# Patient Record
Sex: FEMALE | Race: WHITE | Employment: STUDENT | URBAN - METROPOLITAN AREA
[De-identification: names, ages, dates, MRNs, and addresses within clinical notes are randomized per-mention and may not be internally consistent; named-entity substitution may affect disease eponyms.]

---

## 2021-04-17 ENCOUNTER — OFFICE VISIT (OUTPATIENT)
Dept: ORTHOPEDICS | Facility: CLINIC | Age: 21
End: 2021-04-17
Payer: COMMERCIAL

## 2021-04-17 ENCOUNTER — ANCILLARY PROCEDURE (OUTPATIENT)
Dept: GENERAL RADIOLOGY | Facility: CLINIC | Age: 21
End: 2021-04-17
Attending: FAMILY MEDICINE
Payer: COMMERCIAL

## 2021-04-17 VITALS — BODY MASS INDEX: 24.99 KG/M2 | WEIGHT: 135.8 LBS | HEIGHT: 62 IN

## 2021-04-17 DIAGNOSIS — M79.671 RIGHT FOOT PAIN: Primary | ICD-10-CM

## 2021-04-17 PROCEDURE — 73630 X-RAY EXAM OF FOOT: CPT | Mod: RT | Performed by: RADIOLOGY

## 2021-04-17 PROCEDURE — 99203 OFFICE O/P NEW LOW 30 MIN: CPT | Performed by: FAMILY MEDICINE

## 2021-04-17 ASSESSMENT — MIFFLIN-ST. JEOR: SCORE: 1339.23

## 2021-04-17 NOTE — LETTER
Date:April 19, 2021      Patient was self referred, no letter generated. Do not send.        [facility] Information

## 2021-04-17 NOTE — LETTER
4/17/2021         RE: Edie Saini  [address] [place] [address]        Dear Colleague,    Thank you for referring your patient, Edie Saini, to the [facility] [place]. Please see a copy of my visit note below.      [facility]  SPORTS & ORTHOPEDIC CLINIC VISIT     Apr 17, 2021        ASSESSMENT & PLAN    20 yo with right lateral foot pain after running that is likely coming from her peroneal tendon, but 5th MT stress fracture can't be fully excluded    Reviewed imaging and assessment with patient in detail  Recommended two weeks of rest/cross trainging  Given HEP for peroneal tendons  Gradually return to running after two weeks. Follow up in clinic if still painful.     Sy Rojas MD  [facility] [place]    -----  Chief Complaint   Patient presents with     Right Ankle - Pain       SUBJECTIVE  Edie Saini is a/an 21 year old female who is seen as a self referral for evaluation of  Right foot pain.     The patient is seen by themselves.  The patient is Right handed    Onset: 1 day(s) ago. Patient describes injury as on a long run yesterday and unsure what exactly happened. She stopped and tried to stretch it. Pain is lateral side of foot. She did just get a new pair of running shoes 2 weeks ago. Has been increasing running volumes. 20 miles per week currently.   Location of Pain: right foot   Worsened by: walking   Better with: Rest, Ice   Treatments tried: rest/activity avoidance, ice and ibuprofen  Associated symptoms: swelling    No prior bone stress injury, no weight changes, no dietary restrictions. Normal periods.     Orthopedic/Surgical history: NO  Social History/Occupation: Student       REVIEW OF SYSTEMS:    Do you have fever, chills, weight loss? No    Do you have any vision problems? No    Do you have any chest pain or edema? No    Do you have any shortness of breath or wheezing?  No    Do you have stomach  "problems? No    Do you have any numbness or focal weakness? No    Do you have diabetes? No    Do you have problems with bleeding or clotting? No    Do you have an rashes or other skin lesions? No    OBJECTIVE:  Ht 1.575 m (5' 2\")   Wt 61.6 kg (135 lb 12.8 oz)   BMI 24.84 kg/m       General: Alert, pleasant, no distress  Right foot: warm, well perfused, SILT throughout     Inspection: no swelling, ecchymosis, deformity     ROM:symmetric without pain     Strength:intact, no pain     Palpation:ttp over base of fifth metatarsal with some ttp extending over the peroneal tendons inferior to the lateral mall     Special Tests: none      RADIOLOGY:    3 view xrays of right lateral foot pain performed and reviewed independently demonstrating no acute fracture or dislocation. No djd. See EMR for formal radiology report.                  Again, thank you for allowing me to participate in the care of your patient.        Sincerely,        Sy Rojas MD    "

## 2021-04-17 NOTE — PROGRESS NOTES
"  [facility]  SPORTS & ORTHOPEDIC CLINIC VISIT     Apr 17, 2021        ASSESSMENT & PLAN    22 yo with right lateral foot pain after running that is likely coming from her peroneal tendon, but 5th MT stress fracture can't be fully excluded    Reviewed imaging and assessment with patient in detail  Recommended two weeks of rest/cross trainging  Given HEP for peroneal tendons  Gradually return to running after two weeks. Follow up in clinic if still painful.     Sy Rojas MD  [facility] [place]    -----  Chief Complaint   Patient presents with     Right Ankle - Pain       SUBJECTIVE  Edie Saini is a/an 21 year old female who is seen as a self referral for evaluation of  Right foot pain.     The patient is seen by themselves.  The patient is Right handed    Onset: 1 day(s) ago. Patient describes injury as on a long run yesterday and unsure what exactly happened. She stopped and tried to stretch it. Pain is lateral side of foot. She did just get a new pair of running shoes 2 weeks ago. Has been increasing running volumes. 20 miles per week currently.   Location of Pain: right foot   Worsened by: walking   Better with: Rest, Ice   Treatments tried: rest/activity avoidance, ice and ibuprofen  Associated symptoms: swelling    No prior bone stress injury, no weight changes, no dietary restrictions. Normal periods.     Orthopedic/Surgical history: NO  Social History/Occupation: Student       REVIEW OF SYSTEMS:    Do you have fever, chills, weight loss? No    Do you have any vision problems? No    Do you have any chest pain or edema? No    Do you have any shortness of breath or wheezing?  No    Do you have stomach problems? No    Do you have any numbness or focal weakness? No    Do you have diabetes? No    Do you have problems with bleeding or clotting? No    Do you have an rashes or other skin lesions? No    OBJECTIVE:  Ht 1.575 m (5' 2\")   Wt 61.6 kg (135 lb " 12.8 oz)   BMI 24.84 kg/m       General: Alert, pleasant, no distress  Right foot: warm, well perfused, SILT throughout     Inspection: no swelling, ecchymosis, deformity     ROM:symmetric without pain     Strength:intact, no pain     Palpation:ttp over base of fifth metatarsal with some ttp extending over the peroneal tendons inferior to the lateral mall     Special Tests: none      RADIOLOGY:    3 view xrays of right lateral foot pain performed and reviewed independently demonstrating no acute fracture or dislocation. No djd. See EMR for formal radiology report.

## 2021-08-15 ENCOUNTER — HEALTH MAINTENANCE LETTER (OUTPATIENT)
Age: 21
End: 2021-08-15

## 2021-10-10 ENCOUNTER — HEALTH MAINTENANCE LETTER (OUTPATIENT)
Age: 21
End: 2021-10-10

## 2022-07-24 ENCOUNTER — OFFICE VISIT (OUTPATIENT)
Dept: URGENT CARE | Facility: URGENT CARE | Age: 22
End: 2022-07-24
Payer: COMMERCIAL

## 2022-07-24 VITALS
SYSTOLIC BLOOD PRESSURE: 125 MMHG | TEMPERATURE: 98 F | HEART RATE: 64 BPM | DIASTOLIC BLOOD PRESSURE: 80 MMHG | BODY MASS INDEX: 23.78 KG/M2 | WEIGHT: 130 LBS

## 2022-07-24 DIAGNOSIS — S93.601A FOOT SPRAIN, RIGHT, INITIAL ENCOUNTER: ICD-10-CM
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
## 2022-07-24 DIAGNOSIS — S99.921A FOOT INJURY, RIGHT, INITIAL ENCOUNTER: Primary | ICD-10-CM

## 2022-07-24 PROCEDURE — 99204 OFFICE O/P NEW MOD 45 MIN: CPT | Performed by: INTERNAL MEDICINE

## 2022-07-24 NOTE — PROGRESS NOTES
ASSESSMENT AND PLAN:      ICD-10-CM    1. Foot injury, right, initial encounter  S99.921A XR Foot Right G/E 3 Views     Ankle/Foot Bracing Supplies Order for DME - ONLY FOR DME   2. Injury while descending stairs  W10.9XXA XR Foot Right G/E 3 Views     Ankle/Foot Bracing Supplies Order for DME - ONLY FOR DME   3. Foot sprain, right, initial encounter  S93.601A Ankle/Foot Bracing Supplies Order for DME - ONLY FOR DME       PLAN:  MS Injury/Pain  ice, elevate, rest and Ibuprofen    Patient Instructions       Xray - no fracture.  Radiology review pending.    Post-op shoe   Ice  Elevate  Compression    Recheck if symptoms not improved as expected.        Return if symptoms worsen or fail to improve.        Erika Silverio MD  [facility]    Subjective     Edie Saini is a 22 year old who presents for Patient presents with:  Urgent Care  Foot Pain: Right swollen foot, pt thinks she rolled her foot while walking down the stairs     a new patient of [facility].    MS Injury/Pain    Onset of symptoms was 1 day(s) ago.  Location: right ankle  Context:       The injury happened while on stairs      Mechanism: rolled ankle, did not fall      Patient experienced immediate pain, immediate swelling, was able to bear weight directly after injury, limped  Course of symptoms is same.      Current and Associated symptoms: Pain, Swelling and Bruising  Aggravating Factors: weight-bearing  Therapies to improve symptoms include: ice, ibuprofen and elevation  This is the first time this type of problem has occurred for this patient.         Objective    /80   Pulse 64   Temp 98  F (36.7  C) (Oral)   Wt 59 kg (130 lb)   BMI 23.78 kg/m    Physical Exam  Vitals reviewed.   Constitutional:       Appearance: Normal appearance.   Musculoskeletal:      Comments: Exam of the injured foot - pain, swelling ecchymosis over lateral tarsal & 4th metatarsal  ankle no swelling /tenderness over the lateral malleolus. No  tenderness over the medial aspect of the ankle.   The fifth metatarsal is not tender.     The rest of the foot, ankle and leg exam is normal.     Neurological:      Mental Status: She is alert.            Xray - Reviewed and interpreted by me.  No fracture

## 2022-07-24 NOTE — LETTER
[facility] [place] [address] [place] [address]  Phone: [phone]    July 24, 2022        Edie Saini  [address] [place] [facility] [address]          To whom it may concern:    RE: Edie Saini    Patient was seen and treated today at our clinic.  Please excuse 3 days off of work.    Please contact me for questions or concerns.      Sincerely,        Erika Silverio MD

## 2022-07-24 NOTE — PATIENT INSTRUCTIONS
Xray - no fracture.  Radiology review pending.    Post-op shoe   Ice  Elevate  Compression    Recheck if symptoms not improved as expected.

## 2022-09-24 ENCOUNTER — HEALTH MAINTENANCE LETTER (OUTPATIENT)
Age: 22
End: 2022-09-24

## 2022-12-10 ENCOUNTER — OFFICE VISIT (OUTPATIENT)
Dept: URGENT CARE | Facility: URGENT CARE | Age: 22
End: 2022-12-10
Payer: COMMERCIAL

## 2022-12-10 VITALS
TEMPERATURE: 98.2 F | WEIGHT: 120 LBS | DIASTOLIC BLOOD PRESSURE: 67 MMHG | BODY MASS INDEX: 22.08 KG/M2 | OXYGEN SATURATION: 98 % | HEART RATE: 89 BPM | SYSTOLIC BLOOD PRESSURE: 101 MMHG | HEIGHT: 62 IN

## 2022-12-10 DIAGNOSIS — R07.0 THROAT PAIN: Primary | ICD-10-CM

## 2022-12-10 LAB
DEPRECATED S PYO AG THROAT QL EIA: NEGATIVE
GROUP A STREP BY PCR: NOT DETECTED

## 2022-12-10 PROCEDURE — 87651 STREP A DNA AMP PROBE: CPT | Performed by: PHYSICIAN ASSISTANT

## 2022-12-10 PROCEDURE — 99213 OFFICE O/P EST LOW 20 MIN: CPT | Performed by: PHYSICIAN ASSISTANT

## 2022-12-10 RX ORDER — LEVONORGESTREL 52 MG/1
1 INTRAUTERINE DEVICE INTRAUTERINE ONCE
COMMUNITY

## 2022-12-10 NOTE — PROGRESS NOTES
"SUBJECTIVE:   Edie Saini is a 22 year old female presenting with a chief complaint of fever and sore throat.  Onset of symptoms was 5 day(s) ago.  Course of illness is same.    Severity moderate  Current and Associated symptoms: runny nose and stuffy nose  Treatment measures tried include Tylenol/Ibuprofen.  Predisposing factors include None.    No past medical history on file.  Current Outpatient Medications   Medication Sig Dispense Refill     levonorgestrel (MIRENA, 52 MG,) 20 MCG/DAY IUD 1 each by Intrauterine route once       Social History     Tobacco Use     Smoking status: Never     Smokeless tobacco: Never   Substance Use Topics     Alcohol use: Not on file       ROS:  Review of systems negative except as stated above.    OBJECTIVE:  /67   Pulse 89   Temp 98.2  F (36.8  C) (Oral)   Ht 1.575 m (5' 2\")   Wt 54.4 kg (120 lb)   SpO2 98%   BMI 21.95 kg/m    GENERAL APPEARANCE: healthy, alert and no distress  EYES: conjunctiva clear  HENT: ear canals and TM's normal.  Throat erythematous with uvula midline  NECK: supple, nontender, no lymphadenopathy  RESP: lungs clear to auscultation - no rales, rhonchi or wheezes  CV: regular rates and rhythm, normal S1 S2, no murmur noted  NEURO: Normal strength and tone, sensory exam grossly normal,  normal speech and mentation  SKIN: no suspicious lesions or rashes      Results for orders placed or performed in visit on 12/10/22   Streptococcus A Rapid Screen w/Reflex to PCR - Clinic Collect     Status: Normal    Specimen: Throat; Swab   Result Value Ref Range    Group A Strep antigen Negative Negative       ASSESSMENT:  (R07.0) Throat pain  (primary encounter diagnosis)  Plan: Streptococcus A Rapid Screen w/Reflex to PCR -         Clinic Collect, Group A Streptococcus PCR         Throat Swab      Red flags and emergent follow up discussed, and understood by patient  Follow up with PCP if symptoms worsen or fail to improve          "

## 2023-05-09 ENCOUNTER — OFFICE VISIT (OUTPATIENT)
Dept: URGENT CARE | Facility: URGENT CARE | Age: 23
End: 2023-05-09
Payer: COMMERCIAL

## 2023-05-09 VITALS
HEART RATE: 88 BPM | DIASTOLIC BLOOD PRESSURE: 74 MMHG | OXYGEN SATURATION: 98 % | TEMPERATURE: 98.5 F | BODY MASS INDEX: 22.86 KG/M2 | WEIGHT: 125 LBS | SYSTOLIC BLOOD PRESSURE: 105 MMHG

## 2023-05-09 DIAGNOSIS — H10.32 ACUTE BACTERIAL CONJUNCTIVITIS OF LEFT EYE: ICD-10-CM

## 2023-05-09 DIAGNOSIS — J02.9 SORE THROAT: Primary | ICD-10-CM

## 2023-05-09 LAB — DEPRECATED S PYO AG THROAT QL EIA: NEGATIVE

## 2023-05-09 PROCEDURE — 87651 STREP A DNA AMP PROBE: CPT | Performed by: PHYSICIAN ASSISTANT

## 2023-05-09 PROCEDURE — 99213 OFFICE O/P EST LOW 20 MIN: CPT | Performed by: PHYSICIAN ASSISTANT

## 2023-05-09 RX ORDER — OFLOXACIN 3 MG/ML
1-2 SOLUTION/ DROPS OPHTHALMIC
Qty: 7 ML | Refills: 0 | Status: SHIPPED | OUTPATIENT
Start: 2023-05-09 | End: 2023-05-16

## 2023-05-09 NOTE — PROGRESS NOTES
Chief Complaint   Patient presents with     Urgent Care     Eye Infection     C/O eye infection for 1 day       ASSESSMENT/PLAN:  Edie was seen today for urgent care and eye infection.    Diagnoses and all orders for this visit:    Sore throat  -     Streptococcus A Rapid Screen w/Reflex to PCR - Clinic Collect  -     Group A Streptococcus PCR Throat Swab    Acute bacterial conjunctivitis of left eye  -     ofloxacin (OCUFLOX) 0.3 % ophthalmic solution; Place 1-2 drops Into the left eye every 2 hours (while awake) for 7 days    Unilateral conjunctivitis with purulent discharge noted making more likely to be bacterial.  Start on Ocuflox above since she is a contact lens wear.    Strep test negative.  Likely viral URI.  Symptomatic cares as outlined in the AVS    Rk Hylton PA-C      SUBJECTIVE:  Edie is a 23 year old female who presents to urgent care with 3 to 4 days of nasal congestion, runny nose, sore throat, mild cough and 1 day of left eye redness, irritation and discharge.  Wears contacts but does not sleep in them.    ROS: Pertinent ROS neg other than the symptoms noted above in the HPI.     OBJECTIVE:  /74   Pulse 88   Temp 98.5  F (36.9  C) (Tympanic)   Wt 56.7 kg (125 lb)   SpO2 98%   BMI 22.86 kg/m     GENERAL: healthy, alert and no distress  EYES: Left conjunctiva erythema with dried discharge at the corner of the eye, no foreign body on eyelid eversion, PERRLA, no photophobia  HENT: ear canals and TM's normal, nose and mouth without ulcers or lesions, tonsils 1+ bilaterally and erythematous  NECK: no adenopathy, nontender RESP: lungs clear to auscultation - no rales, rhonchi or wheezes  CV: regular rate and rhythm, normal S1 S2, no S3 or S4, no murmur, click or rub    DIAGNOSTICS    Results for orders placed or performed in visit on 05/09/23   Streptococcus A Rapid Screen w/Reflex to PCR - Clinic Collect     Status: Normal    Specimen: Throat; Swab   Result Value Ref Range    Group A  Strep antigen Negative Negative        Current Outpatient Medications   Medication     levonorgestrel (MIRENA, 52 MG,) 20 MCG/DAY IUD     No current facility-administered medications for this visit.      There is no problem list on file for this patient.     No past medical history on file.  No past surgical history on file.  No family history on file.  Social History     Tobacco Use     Smoking status: Never     Passive exposure: Never     Smokeless tobacco: Never   Vaping Use     Vaping status: Not on file   Substance Use Topics     Alcohol use: Not on file              The plan of care was discussed with the patient. They understand and agree with the course of treatment prescribed. A printed summary was given including instructions and medications.  The use of Dragon/[employer] dictation services may have been used to construct the content in this note; any grammatical or spelling errors are non-intentional. Please contact the author of this note directly if you are in need of any clarification.

## 2023-05-10 LAB — GROUP A STREP BY PCR: NOT DETECTED

## 2023-05-12 ENCOUNTER — OFFICE VISIT (OUTPATIENT)
Dept: FAMILY MEDICINE | Facility: CLINIC | Age: 23
End: 2023-05-12
Payer: COMMERCIAL

## 2023-05-12 VITALS
HEART RATE: 86 BPM | HEIGHT: 62 IN | DIASTOLIC BLOOD PRESSURE: 71 MMHG | SYSTOLIC BLOOD PRESSURE: 111 MMHG | OXYGEN SATURATION: 95 % | BODY MASS INDEX: 22.58 KG/M2 | WEIGHT: 122.7 LBS | TEMPERATURE: 98.3 F

## 2023-05-12 DIAGNOSIS — R23.8 PAPULE: Primary | ICD-10-CM

## 2023-05-12 ASSESSMENT — ANXIETY QUESTIONNAIRES
7. FEELING AFRAID AS IF SOMETHING AWFUL MIGHT HAPPEN: SEVERAL DAYS
GAD7 TOTAL SCORE: 7
2. NOT BEING ABLE TO STOP OR CONTROL WORRYING: SEVERAL DAYS
5. BEING SO RESTLESS THAT IT IS HARD TO SIT STILL: SEVERAL DAYS
GAD7 TOTAL SCORE: 7
3. WORRYING TOO MUCH ABOUT DIFFERENT THINGS: SEVERAL DAYS
1. FEELING NERVOUS, ANXIOUS, OR ON EDGE: SEVERAL DAYS
6. BECOMING EASILY ANNOYED OR IRRITABLE: SEVERAL DAYS

## 2023-05-12 ASSESSMENT — PATIENT HEALTH QUESTIONNAIRE - PHQ9
SUM OF ALL RESPONSES TO PHQ QUESTIONS 1-9: 6
5. POOR APPETITE OR OVEREATING: SEVERAL DAYS

## 2023-05-12 NOTE — PROGRESS NOTES
"Assessment & Plan   Edie was seen today for bump.    Diagnoses and all orders for this visit:    Papule  -Small papule with mild scale. Present for 1 month. Likely started as a pimple and has not completely healed due to ongoing manipulation of the skin. Discussed avoid picking, touching, or squeezing lesion. Avoid retinoid over the lesion until the scale has resolved. Apply vaseline to lips 1-2x/day. If it continues to be nonhealing in 2 weeks, patient will send a [employer] message and I will refer to derm for possible BCC vs SCC given high sun exposure location.    Sierra VALLESDra Sanchez, NP   ______________________________________    Subjective   Edie is a 23 year old patient here today to establish care and bump (On lower lip). She is otherwise healthy with no significant PMH.    Bump on right lower lip  -Present for 1 month  -tried popping it  -Picking at it for the last week  -Previously used nelda bees, but started using vaseline on her lips 2 days ago  -Usually does not have acne prone skin  -Current skin regimen includes hyaluronic acid, retinoid & sunscreen    Normal PAP 2021    Do you need any refills on your Medications today? No    Medical, surgical, family and social histories reviewed and updated as indicated.   Medications and allergies reviewed and updated as indicated.       ROS  Review Of Systems  See HPI    General Physical Exam:  Vitals: /71   Pulse 86   Temp 98.3  F (36.8  C) (Oral)   Ht 1.577 m (5' 2.1\")   Wt 55.7 kg (122 lb 11.2 oz)   LMP 05/02/2023 (Approximate)   SpO2 95%   BMI 22.37 kg/m    Physical Exam  Vitals and nursing note reviewed.   Constitutional:       General: She is not in acute distress.     Appearance: Normal appearance. She is not ill-appearing.   HENT:      Head: Normocephalic and atraumatic.   Eyes:      General: Lids are normal.      Conjunctiva/sclera: Conjunctivae normal.   Pulmonary:      Effort: Pulmonary effort is normal.   Skin:     Comments: 2 mm erythematous " papule with mild scaling.    Neurological:      General: No focal deficit present.      Mental Status: She is alert and oriented to person, place, and time.      Gait: Gait is intact.   Psychiatric:         Mood and Affect: Mood normal.         Behavior: Behavior normal.         Thought Content: Thought content normal.         Judgment: Judgment normal.

## 2023-05-12 NOTE — NURSING NOTE
"ROOM:1  FELIX [facility]    Preferred Name: Edie     How did you hear about us?  Internet / Google Search    23 year old  Chief Complaint   Patient presents with     bump     On lower lip       Blood pressure 111/71, pulse 86, temperature 98.3  F (36.8  C), temperature source Oral, height 1.577 m (5' 2.1\"), weight 55.7 kg (122 lb 11.2 oz), last menstrual period 05/02/2023, SpO2 95 %. Body mass index is 22.37 kg/m .  BP completed using cuff size:        There is no problem list on file for this patient.      Wt Readings from Last 2 Encounters:   05/12/23 55.7 kg (122 lb 11.2 oz)   05/09/23 56.7 kg (125 lb)     BP Readings from Last 3 Encounters:   05/12/23 111/71   05/09/23 105/74   12/10/22 101/67       Allergies   Allergen Reactions     Seasonal Allergies        Current Outpatient Medications   Medication     Cyanocobalamin (VITAMIN B 12 PO)     levonorgestrel (MIRENA, 52 MG,) 20 MCG/DAY IUD     ofloxacin (OCUFLOX) 0.3 % ophthalmic solution     UNABLE TO FIND     VITAMIN D PO     No current facility-administered medications for this visit.       Social History     Tobacco Use     Smoking status: Never     Passive exposure: Current     Smokeless tobacco: Never   Vaping Use     Vaping status: Never Used   Substance Use Topics     Alcohol use: Yes     Drug use: Yes     Types: Marijuana       Honoring Choices - Health Care Directive Guide offered to patient at time of visit.    Health Maintenance Due   Topic Date Due     YEARLY PREVENTIVE VISIT  Never done     CHLAMYDIA SCREENING  Never done     ADVANCE CARE PLANNING  Never done     HEPATITIS B IMMUNIZATION (1 of 3 - 3-dose series) Never done     HIV SCREENING  Never done     HEPATITIS C SCREENING  Never done     PAP  Never done       Immunization History   Administered Date(s) Administered     COVID-19 Bivalent 18+ (Moderna) 10/23/2022     COVID-19 Monovalent 18+ (Moderna) 03/01/2021       No results found for: PAP    No lab results found.        4/17/2021    11:52 AM "   PHQ-2 ( 1999 Pfizer)   Q1: Little interest or pleasure in doing things 0   Q2: Feeling down, depressed or hopeless 0   PHQ-2 Score 0   PHQ-2 Total Score (12-17 Years)- Positive if 3 or more points; Administer PHQ-A if positive 0           5/12/2023     1:34 PM   PHQ-9 SCORE   PHQ-9 Total Score 6           5/12/2023     1:34 PM   CAITLYN-7 SCORE   Total Score 7            View : No data to display.                Carisa Lopez, [facility]    May 12, 2023 1:48 PM

## 2023-10-08 ENCOUNTER — HEALTH MAINTENANCE LETTER (OUTPATIENT)
Age: 23
End: 2023-10-08

## 2024-01-22 ENCOUNTER — OFFICE VISIT (OUTPATIENT)
Dept: OBGYN | Facility: CLINIC | Age: 24
End: 2024-01-22
Attending: ADVANCED PRACTICE MIDWIFE
Payer: COMMERCIAL

## 2024-01-22 VITALS
HEIGHT: 62 IN | BODY MASS INDEX: 22.95 KG/M2 | SYSTOLIC BLOOD PRESSURE: 121 MMHG | HEART RATE: 76 BPM | DIASTOLIC BLOOD PRESSURE: 86 MMHG | WEIGHT: 124.7 LBS

## 2024-01-22 DIAGNOSIS — Z12.4 SCREENING FOR MALIGNANT NEOPLASM OF CERVIX: ICD-10-CM

## 2024-01-22 DIAGNOSIS — Z11.3 ROUTINE SCREENING FOR STI (SEXUALLY TRANSMITTED INFECTION): ICD-10-CM

## 2024-01-22 DIAGNOSIS — N93.9 ABNORMAL UTERINE BLEEDING (AUB): Primary | ICD-10-CM

## 2024-01-22 PROCEDURE — 87491 CHLMYD TRACH DNA AMP PROBE: CPT | Performed by: OBSTETRICS & GYNECOLOGY

## 2024-01-22 PROCEDURE — G0145 SCR C/V CYTO,THINLAYER,RESCR: HCPCS | Performed by: OBSTETRICS & GYNECOLOGY

## 2024-01-22 PROCEDURE — 87591 N.GONORRHOEAE DNA AMP PROB: CPT | Performed by: OBSTETRICS & GYNECOLOGY

## 2024-01-22 PROCEDURE — 99213 OFFICE O/P EST LOW 20 MIN: CPT | Mod: GC | Performed by: OBSTETRICS & GYNECOLOGY

## 2024-01-22 PROCEDURE — 99213 OFFICE O/P EST LOW 20 MIN: CPT | Mod: 25 | Performed by: OBSTETRICS & GYNECOLOGY

## 2024-01-22 NOTE — PATIENT INSTRUCTIONS
Thank you for trusting us with your care!     If you need to contact us for questions about:  Symptoms, Scheduling & Medical Questions; Non-urgent (2-3 day response) Elodia message, Urgent (needing response today) [phone] (if after 3:30pm next day response)   Prescriptions: Please call your Pharmacy   Billing: [place] [phone] or [facility]:[phone]

## 2024-01-22 NOTE — LETTER
"2024       RE: Edie Saini  [address]  [place] [address]     Dear Colleague,    Thank you for referring your patient, Edie Saini, to the [facility]. Please see a copy of my visit note below.    [facility]     SUBJECTIVE  Edie Saini is a 24 year old  female without significant medical history presenting to [facility] for irregular bleeding. She has had a mirena IUD for about 5 years, did not have a period for the first two years, some occasional spotting thereafter but now is having more regular bleeding for the last 6 months- this started with one month of fairly continuous spotting and now is about 5 days of bleeding every month that is heavy bleeding for 2 days- she uses a mensuration cup and empties this once every 12 hours at its heaviest. She also has cramping the week before and during her bleeding. She is also sexually active and would like to have STI screening, denies any abnormal discharge. She is also due for pap smear today.     Reports having a normal pap in  at [facility].     Past Medical History:   Diagnosis Date    Anxiety     PMDD (premenstrual dysphoric disorder)      Current Outpatient Medications   Medication    levonorgestrel (MIRENA, 52 MG,) 20 MCG/DAY IUD    VITAMIN D PO     No current facility-administered medications for this visit.       OBJECTIVE  /86   Pulse 76   Ht 1.577 m (5' 2.1\")   Wt 56.6 kg (124 lb 11.2 oz)   LMP 2024 (Approximate)   BMI 22.73 kg/m    Constitutional: awake, alert, cooperative, NAD  HEENT: normocephalic, anicteric sclerae, MMM   Pulmonary: breathing comfortably on room air  Skin: warm, dry  MSK: no peripheral edema bilaterally   Pelvic Exam:  Vulva: No external lesions, normal hair distribution, normal architecture  Vagina: Moist, pink, no abnormal discharge, well rugated, no lesions  Cervix: " smooth, pink, no visible lesions, IUD strings visualized, 2cm long  Uterus: Normal size, anteverted, non-tender, mobile  Adnexa: Non-tender, no masses        ASSESSMENT/PLAN  Edie Saini is a 24 year old female without significant medical history presenting for IUD check and routine health maintenance. Exam showed IUD still in appropriate place with strings visible, return of bleeding may be secondary to IUD duration, though reassured patient this will continue to be effective for 8 years from placement for contraception. Offered to exchange for a new IUD, patient will consider this in the future if the bleeding becomes more bothersome. Alternative option for heavy menstrual bleeding if this becomes bothersome include TXA, patient declines at this time.   - Pap cytology without HPV obtained  - Chlamydia/gonorrhea sent      RTC as needed.    Patient staffed with Dr. Turcios.    Michelle Drake MD   IM PGY3    Appreciate note by Dr. Drake. Patient has been seen and examined by me with the resident, agree with above note.     Miranda Turcios MD  9:27 AM

## 2024-01-22 NOTE — PROGRESS NOTES
"[facility]     SUBJECTIVE  Edie Saini is a 24 year old  female without significant medical history presenting to women's health clinic for irregular bleeding. She has had a mirena IUD for about 5 years, did not have a period for the first two years, some occasional spotting thereafter but now is having more regular bleeding for the last 6 months- this started with one month of fairly continuous spotting and now is about 5 days of bleeding every month that is heavy bleeding for 2 days- she uses a mensuration cup and empties this once every 12 hours at its heaviest. She also has cramping the week before and during her bleeding. She is also sexually active and would like to have STI screening, denies any abnormal discharge. She is also due for pap smear today.     Reports having a normal pap in  at [facility].     Past Medical History:   Diagnosis Date    Anxiety     PMDD (premenstrual dysphoric disorder)      Current Outpatient Medications   Medication    levonorgestrel (MIRENA, 52 MG,) 20 MCG/DAY IUD    VITAMIN D PO     No current facility-administered medications for this visit.       OBJECTIVE  /86   Pulse 76   Ht 1.577 m (5' 2.1\")   Wt 56.6 kg (124 lb 11.2 oz)   LMP 2024 (Approximate)   BMI 22.73 kg/m    Constitutional: awake, alert, cooperative, NAD  HEENT: normocephalic, anicteric sclerae, MMM   Pulmonary: breathing comfortably on room air  Skin: warm, dry  MSK: no peripheral edema bilaterally   Pelvic Exam:  Vulva: No external lesions, normal hair distribution, normal architecture  Vagina: Moist, pink, no abnormal discharge, well rugated, no lesions  Cervix: smooth, pink, no visible lesions, IUD strings visualized, 2cm long  Uterus: Normal size, anteverted, non-tender, mobile  Adnexa: Non-tender, no masses        ASSESSMENT/PLAN  Edie Saini is a 24 year old female without significant medical history presenting for IUD check and routine health maintenance. Exam showed IUD " still in appropriate place with strings visible, return of bleeding may be secondary to IUD duration, though reassured patient this will continue to be effective for 8 years from placement for contraception. Offered to exchange for a new IUD, patient will consider this in the future if the bleeding becomes more bothersome. Alternative option for heavy menstrual bleeding if this becomes bothersome include TXA, patient declines at this time.   - Pap cytology without HPV obtained  - Chlamydia/gonorrhea sent      RTC as needed.    Patient staffed with Dr. Turcios.    Michelle Drake MD   IM PGY3    Appreciate note by Dr. Drake. Patient has been seen and examined by me with the resident, agree with above note.     Miranda Turcios MD  9:27 AM

## 2024-01-23 LAB
C TRACH DNA SPEC QL NAA+PROBE: NEGATIVE
N GONORRHOEA DNA SPEC QL NAA+PROBE: NEGATIVE

## 2024-01-25 ENCOUNTER — OFFICE VISIT (OUTPATIENT)
Dept: URGENT CARE | Facility: URGENT CARE | Age: 24
End: 2024-01-25
Payer: COMMERCIAL

## 2024-01-25 VITALS
OXYGEN SATURATION: 99 % | RESPIRATION RATE: 15 BRPM | DIASTOLIC BLOOD PRESSURE: 78 MMHG | HEIGHT: 62 IN | WEIGHT: 125 LBS | SYSTOLIC BLOOD PRESSURE: 114 MMHG | TEMPERATURE: 98.2 F | HEART RATE: 87 BPM | BODY MASS INDEX: 23 KG/M2

## 2024-01-25 DIAGNOSIS — Z20.818 EXPOSURE TO STREP THROAT: Primary | ICD-10-CM

## 2024-01-25 LAB
BKR LAB AP GYN ADEQUACY: NORMAL
BKR LAB AP GYN INTERPRETATION: NORMAL
BKR LAB AP HPV REFLEX: NO
BKR LAB AP LMP: NORMAL
BKR LAB AP PREVIOUS ABNORMAL: NORMAL
DEPRECATED S PYO AG THROAT QL EIA: NEGATIVE
GROUP A STREP BY PCR: NOT DETECTED
PATH REPORT.COMMENTS IMP SPEC: NORMAL
PATH REPORT.COMMENTS IMP SPEC: NORMAL
PATH REPORT.RELEVANT HX SPEC: NORMAL

## 2024-01-25 PROCEDURE — 36415 COLL VENOUS BLD VENIPUNCTURE: CPT | Performed by: PHYSICIAN ASSISTANT

## 2024-01-25 PROCEDURE — 99214 OFFICE O/P EST MOD 30 MIN: CPT | Performed by: PHYSICIAN ASSISTANT

## 2024-01-25 PROCEDURE — 99000 SPECIMEN HANDLING OFFICE-LAB: CPT | Performed by: PHYSICIAN ASSISTANT

## 2024-01-25 PROCEDURE — 87651 STREP A DNA AMP PROBE: CPT | Performed by: PHYSICIAN ASSISTANT

## 2024-01-25 PROCEDURE — 86215 DEOXYRIBONUCLEASE ANTIBODY: CPT | Mod: 90 | Performed by: PHYSICIAN ASSISTANT

## 2024-01-25 PROCEDURE — 86060 ANTISTREPTOLYSIN O TITER: CPT | Mod: 90 | Performed by: PHYSICIAN ASSISTANT

## 2024-01-25 ASSESSMENT — ENCOUNTER SYMPTOMS: NERVOUS/ANXIOUS: 1

## 2024-01-25 NOTE — PATIENT INSTRUCTIONS
Will contact you with results through [employer]  Rapid strep is negative  Strep PCR  and labs for PANDAS are pending

## 2024-01-25 NOTE — PROGRESS NOTES
Assessment & Plan     Exposure to strep throat    -Exposure to strep, concerned about PANDAS.  Patient notes history of PANDAS as a teenager.  -Rapid strep is negative.  Pending strep PCR.  Advised patient that if ASO and ADB are low, PANDAS is less likely.   - Streptolysin O Antibody (ASO)  - DNase-B Antibody  - Streptococcus A Rapid Screen w/Reflex to PCR - Clinic Collect  - Group A Streptococcus PCR Throat Swab     Results for orders placed or performed in visit on 01/25/24   Streptococcus A Rapid Screen w/Reflex to PCR - Clinic Collect     Status: Normal    Specimen: Throat; Swab   Result Value Ref Range    Group A Strep antigen Negative Negative       Patient Instructions   Will contact you with results through [employer]  Rapid strep is negative  Strep PCR  and labs for PANDAS are pending    Return if symptoms worsen or fail to improve, for Follow up.    At the end of the encounter, I discussed results, diagnosis, medications. Discussed red flags for immediate return to clinic/ER, as well as indications for follow up if no improvement. Patient understood and agreed to plan. Patient was stable for discharge.    Jerzy Irizarry is a 24 year old female who presents to clinic today with  for the following health issues:  Chief Complaint   Patient presents with    Urgent Care     Wants strep test, was exposed but has no symptoms but is typically a carrier for it       HPI    Patient reports exposure to strep over 1 month ago.  She has no symptoms.  She wants a test to rule out PANDAS.  She has a history of PANDAS when she was 14.  Patient will be going in for psychiatric evaluation for anxiety and OCD.  She is requesting a test to rule out PANDAS before she starts treatment for anxiety and OCD.  She notes PANDAS is associated with mental health illnesses.    Review of Systems   Psychiatric/Behavioral:  The patient is nervous/anxious.    All other systems reviewed and are negative.      Problem List:  There are no  "relevant problems documented for this patient.      Past Medical History:   Diagnosis Date    Anxiety     PMDD (premenstrual dysphoric disorder)        Social History     Tobacco Use    Smoking status: Never     Passive exposure: Current    Smokeless tobacco: Never   Substance Use Topics    Alcohol use: Yes           Objective    /78   Pulse 87   Temp 98.2  F (36.8  C) (Temporal)   Resp 15   Ht 1.575 m (5' 2\")   Wt 56.7 kg (125 lb)   LMP 01/02/2024 (Approximate)   SpO2 99%   BMI 22.86 kg/m    Physical Exam  Constitutional:       Appearance: Normal appearance.   HENT:      Head: Normocephalic.      Mouth/Throat:      Mouth: Mucous membranes are moist.      Pharynx: Oropharynx is clear. Uvula midline. No posterior oropharyngeal erythema.   Cardiovascular:      Rate and Rhythm: Normal rate and regular rhythm.   Pulmonary:      Effort: Pulmonary effort is normal.      Breath sounds: Normal breath sounds.   Skin:     General: Skin is warm and dry.      Findings: No rash.   Neurological:      Mental Status: She is alert.   Psychiatric:         Mood and Affect: Mood normal.         Behavior: Behavior normal.              Vandana Son PA-C    "

## 2024-01-27 LAB
ASO AB SERPL-ACNC: 62 IU/ML
STREP DNASE B SER-ACNC: <86 U/ML

## 2024-02-04 ENCOUNTER — OFFICE VISIT (OUTPATIENT)
Dept: URGENT CARE | Facility: URGENT CARE | Age: 24
End: 2024-02-04
Payer: COMMERCIAL

## 2024-02-04 VITALS
DIASTOLIC BLOOD PRESSURE: 75 MMHG | HEIGHT: 62 IN | SYSTOLIC BLOOD PRESSURE: 119 MMHG | HEART RATE: 108 BPM | WEIGHT: 125 LBS | TEMPERATURE: 98.1 F | OXYGEN SATURATION: 100 % | BODY MASS INDEX: 23 KG/M2

## 2024-02-04 DIAGNOSIS — Z11.3 SCREEN FOR STD (SEXUALLY TRANSMITTED DISEASE): ICD-10-CM

## 2024-02-04 DIAGNOSIS — N76.0 BACTERIAL VAGINOSIS: Primary | ICD-10-CM

## 2024-02-04 DIAGNOSIS — R10.9 ABDOMINAL CRAMPING: ICD-10-CM

## 2024-02-04 DIAGNOSIS — B96.89 BACTERIAL VAGINOSIS: Primary | ICD-10-CM

## 2024-02-04 LAB
ALBUMIN UR-MCNC: NEGATIVE MG/DL
APPEARANCE UR: CLEAR
BILIRUB UR QL STRIP: NEGATIVE
CLUE CELLS: PRESENT
COLOR UR AUTO: YELLOW
GLUCOSE UR STRIP-MCNC: NEGATIVE MG/DL
HCV AB SERPL QL IA: NONREACTIVE
HGB UR QL STRIP: NEGATIVE
HIV 1+2 AB+HIV1 P24 AG SERPL QL IA: NONREACTIVE
KETONES UR STRIP-MCNC: NEGATIVE MG/DL
LEUKOCYTE ESTERASE UR QL STRIP: NEGATIVE
NITRATE UR QL: NEGATIVE
PH UR STRIP: 7 [PH] (ref 5–7)
SP GR UR STRIP: 1.01 (ref 1–1.03)
TRICHOMONAS, WET PREP: ABNORMAL
UROBILINOGEN UR STRIP-ACNC: 0.2 E.U./DL
WBC'S/HIGH POWER FIELD, WET PREP: ABNORMAL
YEAST, WET PREP: ABNORMAL

## 2024-02-04 PROCEDURE — 99214 OFFICE O/P EST MOD 30 MIN: CPT | Performed by: INTERNAL MEDICINE

## 2024-02-04 PROCEDURE — 36415 COLL VENOUS BLD VENIPUNCTURE: CPT | Performed by: INTERNAL MEDICINE

## 2024-02-04 PROCEDURE — 86780 TREPONEMA PALLIDUM: CPT | Performed by: INTERNAL MEDICINE

## 2024-02-04 PROCEDURE — 87491 CHLMYD TRACH DNA AMP PROBE: CPT | Performed by: INTERNAL MEDICINE

## 2024-02-04 PROCEDURE — 86803 HEPATITIS C AB TEST: CPT | Performed by: INTERNAL MEDICINE

## 2024-02-04 PROCEDURE — 87591 N.GONORRHOEAE DNA AMP PROB: CPT | Performed by: INTERNAL MEDICINE

## 2024-02-04 PROCEDURE — 87210 SMEAR WET MOUNT SALINE/INK: CPT

## 2024-02-04 PROCEDURE — 81003 URINALYSIS AUTO W/O SCOPE: CPT

## 2024-02-04 PROCEDURE — 87389 HIV-1 AG W/HIV-1&-2 AB AG IA: CPT | Performed by: INTERNAL MEDICINE

## 2024-02-04 RX ORDER — IBUPROFEN 200 MG
200 TABLET ORAL EVERY 4 HOURS PRN
COMMUNITY
End: 2024-09-13

## 2024-02-04 RX ORDER — METRONIDAZOLE 7.5 MG/G
1 GEL VAGINAL DAILY
Qty: 35 G | Refills: 0 | Status: SHIPPED | OUTPATIENT
Start: 2024-02-04 | End: 2024-02-11

## 2024-02-04 NOTE — PROGRESS NOTES
"  Assessment & Plan     Bacterial vaginosis  Discussed the pathogenesis of bacterial vaginosis and relationship to sexual activity.  Recommended regular condom use as a way to reduce the frequency of BV.  - metroNIDAZOLE (METROGEL) 0.75 % vaginal gel; Place 1 applicator (5 g) vaginally daily for 7 days    Abdominal cramping  - UA Macroscopic with reflex to Microscopic and Culture - Clinic Collect  - Wet prep - Clinic Collect  - Chlamydia & Gonorrhea by PCR, GICH/Range - Clinic Collect  - HCG qualitative urine POCT, Enter/Edit Result    Screen for STD (sexually transmitted disease)  - HIV Antigen Antibody Combo; Future  - Hepatitis C antibody; Future  - Treponema Abs w Reflex to RPR and Titer; Future  - HIV Antigen Antibody Combo  - Hepatitis C antibody  - Treponema Abs w Reflex to RPR and Titer    Subjective   Edie is a 24 year old, presenting for the following health issues:  Urgent Care and Abdominal Cramping    HPI   Chief complaint of pelvic cramping and pain for the last several days with some vaginal discharge.  Mild dysuria.  Sexually active. Has had similar mild and self-limited episodes in the past - this more severe.        Objective    /75   Pulse 108   Temp 98.1  F (36.7  C) (Temporal)   Ht 1.575 m (5' 2\")   Wt 56.7 kg (125 lb)   LMP 01/02/2024 (Approximate)   SpO2 100%   BMI 22.86 kg/m    Body mass index is 22.86 kg/m .  Physical Exam   GENERAL: alert and no distress  ABDOMEN: soft, nontender, no hepatosplenomegaly, no masses and bowel sounds normal    Results for orders placed or performed in visit on 02/04/24 (from the past 24 hour(s))   UA Macroscopic with reflex to Microscopic and Culture - Clinic Collect    Specimen: Urine, Clean Catch   Result Value Ref Range    Color Urine Yellow Colorless, Straw, Light Yellow, Yellow    Appearance Urine Clear Clear    Glucose Urine Negative Negative mg/dL    Bilirubin Urine Negative Negative    Ketones Urine Negative Negative mg/dL    Specific " Gravity Urine 1.015 1.003 - 1.035    Blood Urine Negative Negative    pH Urine 7.0 5.0 - 7.0    Protein Albumin Urine Negative Negative mg/dL    Urobilinogen Urine 0.2 0.2, 1.0 E.U./dL    Nitrite Urine Negative Negative    Leukocyte Esterase Urine Negative Negative    Narrative    Microscopic not indicated   Wet prep - Clinic Collect    Specimen: Vagina; Swab   Result Value Ref Range    Trichomonas Absent Absent    Yeast Absent Absent    Clue Cells Present (A) Absent    WBCs/high power field 3+ (A) None           Signed Electronically by: Herson Guerrero MD

## 2024-02-05 LAB
C TRACH DNA SPEC QL PROBE+SIG AMP: NEGATIVE
N GONORRHOEA DNA SPEC QL NAA+PROBE: NEGATIVE
T PALLIDUM AB SER QL: NONREACTIVE

## 2024-04-08 ENCOUNTER — OFFICE VISIT (OUTPATIENT)
Dept: URGENT CARE | Facility: URGENT CARE | Age: 24
End: 2024-04-08
Payer: COMMERCIAL

## 2024-04-08 VITALS
OXYGEN SATURATION: 99 % | SYSTOLIC BLOOD PRESSURE: 114 MMHG | HEART RATE: 71 BPM | RESPIRATION RATE: 16 BRPM | WEIGHT: 120 LBS | BODY MASS INDEX: 22.08 KG/M2 | DIASTOLIC BLOOD PRESSURE: 76 MMHG | HEIGHT: 62 IN | TEMPERATURE: 98.5 F

## 2024-04-08 DIAGNOSIS — J03.90 EXUDATIVE TONSILLITIS: Primary | ICD-10-CM

## 2024-04-08 DIAGNOSIS — Z11.3 SCREEN FOR STD (SEXUALLY TRANSMITTED DISEASE): ICD-10-CM

## 2024-04-08 DIAGNOSIS — R07.0 THROAT PAIN: ICD-10-CM

## 2024-04-08 DIAGNOSIS — N89.8 VAGINAL ITCHING: ICD-10-CM

## 2024-04-08 DIAGNOSIS — B37.31 YEAST INFECTION OF THE VAGINA: ICD-10-CM

## 2024-04-08 DIAGNOSIS — J30.9 ALLERGIC RHINITIS, UNSPECIFIED SEASONALITY, UNSPECIFIED TRIGGER: ICD-10-CM

## 2024-04-08 LAB
ALBUMIN UR-MCNC: NEGATIVE MG/DL
APPEARANCE UR: CLEAR
BACTERIA #/AREA URNS HPF: ABNORMAL /HPF
BILIRUB UR QL STRIP: NEGATIVE
CLUE CELLS: ABNORMAL
COLOR UR AUTO: YELLOW
DEPRECATED S PYO AG THROAT QL EIA: NEGATIVE
GLUCOSE UR STRIP-MCNC: NEGATIVE MG/DL
GROUP A STREP BY PCR: NOT DETECTED
HGB UR QL STRIP: NEGATIVE
KETONES UR STRIP-MCNC: NEGATIVE MG/DL
LEUKOCYTE ESTERASE UR QL STRIP: ABNORMAL
NITRATE UR QL: NEGATIVE
PH UR STRIP: 7 [PH] (ref 5–7)
RBC #/AREA URNS AUTO: ABNORMAL /HPF
SP GR UR STRIP: 1.01 (ref 1–1.03)
TRICHOMONAS, WET PREP: ABNORMAL
UROBILINOGEN UR STRIP-ACNC: 0.2 E.U./DL
WBC #/AREA URNS AUTO: ABNORMAL /HPF
WBC'S/HIGH POWER FIELD, WET PREP: ABNORMAL
YEAST, WET PREP: PRESENT

## 2024-04-08 PROCEDURE — 87491 CHLMYD TRACH DNA AMP PROBE: CPT | Performed by: PHYSICIAN ASSISTANT

## 2024-04-08 PROCEDURE — 87210 SMEAR WET MOUNT SALINE/INK: CPT | Performed by: PHYSICIAN ASSISTANT

## 2024-04-08 PROCEDURE — 87591 N.GONORRHOEAE DNA AMP PROB: CPT | Performed by: PHYSICIAN ASSISTANT

## 2024-04-08 PROCEDURE — 81001 URINALYSIS AUTO W/SCOPE: CPT | Performed by: PHYSICIAN ASSISTANT

## 2024-04-08 PROCEDURE — 99214 OFFICE O/P EST MOD 30 MIN: CPT | Performed by: PHYSICIAN ASSISTANT

## 2024-04-08 PROCEDURE — 87086 URINE CULTURE/COLONY COUNT: CPT | Performed by: PHYSICIAN ASSISTANT

## 2024-04-08 PROCEDURE — 87651 STREP A DNA AMP PROBE: CPT | Performed by: PHYSICIAN ASSISTANT

## 2024-04-08 RX ORDER — FLUCONAZOLE 150 MG/1
TABLET ORAL
Qty: 2 TABLET | Refills: 0 | Status: SHIPPED | OUTPATIENT
Start: 2024-04-08 | End: 2024-09-13

## 2024-04-08 RX ORDER — CETIRIZINE HYDROCHLORIDE 10 MG/1
10 TABLET ORAL DAILY
Qty: 30 TABLET | Refills: 3 | Status: SHIPPED | OUTPATIENT
Start: 2024-04-08 | End: 2024-09-13

## 2024-04-08 RX ORDER — CEFDINIR 300 MG/1
300 CAPSULE ORAL 2 TIMES DAILY
Qty: 14 CAPSULE | Refills: 0 | Status: SHIPPED | OUTPATIENT
Start: 2024-04-08 | End: 2024-04-15

## 2024-04-08 NOTE — PROGRESS NOTES
Patient presents with:  Pharyngitis: X2 days of sore throat, white spots in back of throat, swelling in tonsils   Urgent Care  Vaginal Problem: X2 days of itching       (J03.90) Exudative tonsillitis  (primary encounter diagnosis)  Comment:   Plan: cefdinir (OMNICEF) 300 MG capsule        Take 1 capsule twice a day for 7 days.      Replace toothbrush in 48 hours.    Salt water gargles.      Tylenol or ibuprofen as needed.      Take probiotic while on antibiotic.      (N89.8) Vaginal itching  Comment: yeast  Plan: Wet prep - Clinic Collect, UA Macroscopic with         reflex to Microscopic and Culture - Clinic         Collect, Urine Microscopic Exam, Urine Culture            (R07.0) Throat pain  Comment:   Plan: Streptococcus A Rapid Screen w/Reflex to PCR -         Clinic Collect, Group A Streptococcus PCR         Throat Swab            (Z11.3) Screen for STD (sexually transmitted disease)  Comment:   Plan: Chlamydia & Gonorrhea by PCR, GICH/Range -         Clinic Collect        [facility]  We will contact you if anything is positive and needs treatment.      (J30.9) Allergic rhinitis, unspecified seasonality, unspecified trigger  Comment:   Plan: cetirizine (ZYRTEC) 10 MG tablet        Take nightly for the next 2-3 weeks    (B37.31) Yeast infection of the vagina  Comment:   Plan: fluconazole (DIFLUCAN) 150 MG tablet          Take 1 po today and 1 po after finishing antibiotic.        At the end of the encounter, I discussed results, diagnosis, medications. Discussed red flags for immediate return to clinic/ER, as well as indications for follow up if no improvement. Patient understood and agreed to plan. Patient was stable for discharge     If not improving or if condition worsens, follow up with your Primary Care Provider          SUBJECTIVE:   Edie Saini is a 24 year old female who presents today with throat pain and exudate for the past couple of days and vaginal  "discharge and itching for the past 5 days.      Also has urinary frequency.  Denies any back or abdominal pain.      Declines serum STI testing today.       Past Medical History:   Diagnosis Date    Anxiety     PMDD (premenstrual dysphoric disorder)          Current Outpatient Medications   Medication Sig Dispense Refill    Multiple Vitamins-Iron (DAILY-SAMI/IRON/BETA-CAROTENE) TABS TAKE 1 TABLET BY MOUTH DAILY. (Patient not taking: Reported on 10/19/2020) 30 tablet 7     Social History     Tobacco Use    Smoking status: Never Smoker    Smokeless tobacco: Never Used   Substance Use Topics    Alcohol use: Not on file     Family History   Problem Relation Age of Onset    Diabetes Mother     Diabetes Father          ROS:    10 point ROS of systems including Constitutional, Eyes, Respiratory, Cardiovascular, Gastroenterology, Genitourinary, Integumentary, Muscularskeletal, Psychiatric ,neurological were all negative except for pertinent positives noted in my HPI       OBJECTIVE:  /76   Pulse 71   Temp 98.5  F (36.9  C) (Oral)   Resp 16   Ht 1.575 m (5' 2\")   Wt 54.4 kg (120 lb)   SpO2 99%   BMI 21.95 kg/m    Physical Exam:  GENERAL APPEARANCE: healthy, alert and no distress  EYES: EOMI,  PERRL, conjunctiva clear  HENT: ear canals and TM's normal.  Nose and mouth without ulcers, erythema or lesions  HENT: tonsillar erythema and tonsillar exudate  NECK: supple, nontender, no lymphadenopathy  RESP: lungs clear to auscultation - no rales, rhonchi or wheezes  CV: regular rates and rhythm, normal S1 S2, no murmur noted  ABDOMEN:  soft, nontender, no HSM or masses and bowel sounds normal  SKIN: no suspicious lesions or rashes    Results for orders placed or performed in visit on 04/08/24   UA Macroscopic with reflex to Microscopic and Culture - Clinic Collect     Status: Abnormal    Specimen: Urine, Clean Catch   Result Value Ref Range    Color Urine Yellow Colorless, Straw, Light Yellow, Yellow    Appearance " Urine Clear Clear    Glucose Urine Negative Negative mg/dL    Bilirubin Urine Negative Negative    Ketones Urine Negative Negative mg/dL    Specific Gravity Urine 1.015 1.003 - 1.035    Blood Urine Negative Negative    pH Urine 7.0 5.0 - 7.0    Protein Albumin Urine Negative Negative mg/dL    Urobilinogen Urine 0.2 0.2, 1.0 E.U./dL    Nitrite Urine Negative Negative    Leukocyte Esterase Urine Moderate (A) Negative   Urine Microscopic Exam     Status: Abnormal   Result Value Ref Range    Bacteria Urine Few (A) None Seen /HPF    RBC Urine 2-5 (A) 0-2 /HPF /HPF    WBC Urine 5-10 (A) 0-5 /HPF /HPF   Streptococcus A Rapid Screen w/Reflex to PCR - Clinic Collect     Status: Normal    Specimen: Throat; Swab   Result Value Ref Range    Group A Strep antigen Negative Negative   Wet prep - Clinic Collect     Status: Abnormal    Specimen: Vagina; Swab   Result Value Ref Range    Trichomonas Absent Absent    Yeast Present (A) Absent    Clue Cells Absent Absent    WBCs/high power field 2+ (A) None

## 2024-04-08 NOTE — PATIENT INSTRUCTIONS
(J03.90) Exudative tonsillitis  (primary encounter diagnosis)  Comment:   Plan: cefdinir (OMNICEF) 300 MG capsule        Take 1 capsule twice a day for 7 days.      Replace toothbrush in 48 hours.    Salt water gargles.      Tylenol or ibuprofen as needed.      Take probiotic while on antibiotic.      (N89.8) Vaginal itching  Comment: yeast  Plan: Wet prep - Clinic Collect, UA Macroscopic with         reflex to Microscopic and Culture - Clinic         Collect, Urine Microscopic Exam, Urine Culture            (R07.0) Throat pain  Comment:   Plan: Streptococcus A Rapid Screen w/Reflex to PCR -         Clinic Collect, Group A Streptococcus PCR         Throat Swab            (Z11.3) Screen for STD (sexually transmitted disease)  Comment:   Plan: Chlamydia & Gonorrhea by PCR, GICH/Range -         Clinic Collect        Results will be available in [employer] in a couple of days.  We will contact you if anything is positive and needs treatment.      (J30.9) Allergic rhinitis, unspecified seasonality, unspecified trigger  Comment:   Plan: cetirizine (ZYRTEC) 10 MG tablet        Take nightly for the next 2-3 weeks    (B37.31) Yeast infection of the vagina  Comment:   Plan: fluconazole (DIFLUCAN) 150 MG tablet          Take 1 po today and 1 po after finishing antibiotic.

## 2024-04-09 LAB
BACTERIA UR CULT: NORMAL
C TRACH DNA SPEC QL PROBE+SIG AMP: NEGATIVE
N GONORRHOEA DNA SPEC QL NAA+PROBE: NEGATIVE

## 2024-06-12 ENCOUNTER — OFFICE VISIT (OUTPATIENT)
Dept: URGENT CARE | Facility: URGENT CARE | Age: 24
End: 2024-06-12
Payer: COMMERCIAL

## 2024-06-12 VITALS
HEART RATE: 101 BPM | DIASTOLIC BLOOD PRESSURE: 78 MMHG | OXYGEN SATURATION: 98 % | SYSTOLIC BLOOD PRESSURE: 108 MMHG | TEMPERATURE: 98 F | BODY MASS INDEX: 21.95 KG/M2 | RESPIRATION RATE: 15 BRPM | WEIGHT: 120 LBS

## 2024-06-12 DIAGNOSIS — S06.0X0A CONCUSSION WITHOUT LOSS OF CONSCIOUSNESS, INITIAL ENCOUNTER: Primary | ICD-10-CM

## 2024-06-12 PROCEDURE — 99214 OFFICE O/P EST MOD 30 MIN: CPT | Performed by: NURSE PRACTITIONER
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
## 2024-07-23 ENCOUNTER — OFFICE VISIT (OUTPATIENT)
Dept: URGENT CARE | Facility: URGENT CARE | Age: 24
End: 2024-07-23
Payer: COMMERCIAL

## 2024-07-23 VITALS
HEIGHT: 62 IN | WEIGHT: 120 LBS | OXYGEN SATURATION: 98 % | HEART RATE: 78 BPM | DIASTOLIC BLOOD PRESSURE: 66 MMHG | BODY MASS INDEX: 22.08 KG/M2 | SYSTOLIC BLOOD PRESSURE: 102 MMHG | TEMPERATURE: 98 F

## 2024-07-23 DIAGNOSIS — K60.2 ANAL FISSURE: Primary | ICD-10-CM

## 2024-07-23 PROCEDURE — 99213 OFFICE O/P EST LOW 20 MIN: CPT | Performed by: NURSE PRACTITIONER

## 2024-07-23 RX ORDER — HYDROCORTISONE ACETATE 25 MG/1
25 SUPPOSITORY RECTAL 2 TIMES DAILY PRN
Qty: 10 SUPPOSITORY | Refills: 0 | Status: SHIPPED | OUTPATIENT
Start: 2024-07-23 | End: 2024-09-13

## 2024-07-24 NOTE — PATIENT INSTRUCTIONS
Rectal fissure at 12 o'clock  Medications to soften stools-  Mg citrate  Senna  Psyllium (Metamucil) powder, wafer or capsule 1-3 times daily  Methylcelluolse (Citrucel) capsules or powder 1-3 times daily  Polyethylene glycol (MiraLax) powder daily  Docusate sodium (Colace, Dulcolax) liquid or capsule  Drink plenty of water.  Increase fiber in diet- Foods with high fiber content include:  dates, prunes, strawberries, apple with skin, pear with skin, green beans, broccoli, brussles sprouts, carrots, peas, spinach, zucchini, baked beans, kidney beans, peanuts, bran muffins, oatmeal, oat bran, wheat bran and rolled oats.  Follow up in ER if symptoms worsen, you develop a high fever, abdominal pain, lethargy, increased bloody black stool, shortness of breath, weakness, dizziness.

## 2024-07-24 NOTE — PROGRESS NOTES
"Chief Complaint   Patient presents with    Urgent Care    Rectal Problem     Pt in clinic c/o rectal pain with bowel movements.  Pt also states she has noticed small blood in stool.     SUBJECTIVE:   Edie Saini is a 24 year old female who  presents today with rectal pain during bowel movements.  This has been happening for several months intermittently.  She has daily normal BMs.  Noticed scant blood with stools more so recently.  She suspects a fissure or hemorrhoid.  Declines fever sweats chills pus nausea abdominal discomfort shortness of breath dizziness fatigue.  She eats a vegan diet with a lot of fiber.  No personal or relevant family past medical history of IBD Crohn's colitis or colorectal conditions.    Past Medical History:   Diagnosis Date    Anxiety     PMDD (premenstrual dysphoric disorder)      Current Outpatient Medications   Medication Sig Dispense Refill    hydrocortisone (ANUSOL-HC) 25 MG suppository Place 1 suppository (25 mg) rectally 2 times daily as needed for hemorrhoids 10 suppository 0    levonorgestrel (MIRENA, 52 MG,) 20 MCG/DAY IUD 1 each by Intrauterine route once      cetirizine (ZYRTEC) 10 MG tablet Take 1 tablet (10 mg) by mouth daily 30 tablet 3    fluconazole (DIFLUCAN) 150 MG tablet Take 1 po today and 1 po after you finish antibiotic 2 tablet 0    ibuprofen (ADVIL/MOTRIN) 200 MG tablet Take 200 mg by mouth every 4 hours as needed for pain (Patient not taking: Reported on 7/23/2024)      VITAMIN D PO Take 1 capsule by mouth With fatty acids (Patient not taking: Reported on 7/23/2024)       Social History     Tobacco Use    Smoking status: Never     Passive exposure: Current    Smokeless tobacco: Never   Substance Use Topics    Alcohol use: Yes     Allergies   Allergen Reactions    Seasonal Allergies        Review of Systems  All systems negative except for those listed above in HPI.    OBJECTIVE:  /66   Pulse 78   Temp 98  F (36.7  C) (Temporal)   Ht 1.575 m (5' 2\")   Wt " 54.4 kg (120 lb)   SpO2 98%   BMI 21.95 kg/m      Physical Exam  Vitals reviewed.   Constitutional:       General: She is not in acute distress.     Appearance: Normal appearance. She is not ill-appearing, toxic-appearing or diaphoretic.   Cardiovascular:      Pulses: Normal pulses.   Abdominal:      General: Abdomen is flat.      Palpations: Abdomen is soft.      Tenderness: There is no abdominal tenderness. There is no guarding.   Genitourinary:     Comments: There is a rectal fissure at 10 o'clock externally  Musculoskeletal:         General: Normal range of motion.   Skin:     General: Skin is warm.   Neurological:      Mental Status: She is alert.      Motor: No weakness.      Gait: Gait normal.   Psychiatric:         Mood and Affect: Mood normal.         Behavior: Behavior normal.       ASSESSMENT:    ICD-10-CM    1. Anal fissure  K60.2 hydrocortisone (ANUSOL-HC) 25 MG suppository        PLAN:     Fissure  Anusol and epsom salt baths  Medications to soften stools-  Mg citrate  Senna  Psyllium (Metamucil) powder, wafer or capsule 1-3 times daily  Methylcelluolse (Citrucel) capsules or powder 1-3 times daily  Polyethylene glycol (MiraLax) powder daily  Docusate sodium (Colace, Dulcolax) liquid or capsule  Drink plenty of water.  Increase fiber in diet- Foods with high fiber content include:  dates, prunes, strawberries, apple with skin, pear with skin, green beans, broccoli, brussles sprouts, carrots, peas, spinach, zucchini, baked beans, kidney beans, peanuts, bran muffins, oatmeal, oat bran, wheat bran and rolled oats.  Follow up in ER if symptoms worsen, you develop a high fever, abdominal pain, lethargy, increased bloody black stool, shortness of breath, weakness, dizziness.    Follow up with primary care provider with any problems, questions or concerns or if symptoms worsen or fail to improve. Patient agreed to plan and verbalized understanding.    ROBERT Enriquez-[facility]  [place]

## 2024-09-06 ENCOUNTER — VIRTUAL VISIT (OUTPATIENT)
Dept: URGENT CARE | Facility: CLINIC | Age: 24
End: 2024-09-06
Payer: COMMERCIAL

## 2024-09-06 DIAGNOSIS — J30.2 SEASONAL ALLERGIC RHINITIS, UNSPECIFIED TRIGGER: Primary | ICD-10-CM

## 2024-09-06 PROCEDURE — 99213 OFFICE O/P EST LOW 20 MIN: CPT | Mod: 95

## 2024-09-06 RX ORDER — FLUTICASONE PROPIONATE 50 MCG
2 SPRAY, SUSPENSION (ML) NASAL DAILY
Qty: 16 G | Refills: 0 | Status: SHIPPED | OUTPATIENT
Start: 2024-09-06

## 2024-09-06 NOTE — PROGRESS NOTES
"  Edie Saini is a 24 year old female who is being evaluated via a billable video visit.      The patient has been notified of following at the time of scheduling video visit:     \"This video visit will be conducted via a video call between you and your physician/provider. We have found that certain health care needs can be provided without the need for a physical exam.  This service lets us provide the care you need with a video conversation.  If a prescription is necessary we can send it directly to your pharmacy.  If lab work is needed we can place an order for that and you can then stop by our lab to have the test done at a later time.\"   Patient has given consent for video visit?  YES    SUBJECTIVE:  Edie Saini is an 24 year old female who presents for allergies.  Has hx of seasonal allergies and has had a flare up of sxs recently.  Has had nasal congestion, some throat congestion, itchy eyes, and a cough.  Cough seems dry but feels like it has moved into chest a little.  No fevers.  No headache or body aches.  Has had sxs for a week or so.  Took claritin for two days which helped quite a bit, but stopped because it made her a little drowsy.    PMH:   has a past medical history of Anxiety and PMDD (premenstrual dysphoric disorder).  There is no problem list on file for this patient.    Social History     Socioeconomic History    Marital status: Single   Tobacco Use    Smoking status: Never     Passive exposure: Current    Smokeless tobacco: Never   Vaping Use    Vaping status: Never Used   Substance and Sexual Activity    Alcohol use: Yes    Drug use: Yes     Types: Marijuana    Sexual activity: Yes     Partners: Male     Birth control/protection: I.U.D.     Social Determinants of Health     Financial Resource Strain: Low Risk  (7/10/2024)    Financial Resource Strain     Within the past 12 months, have you or your family members you live with been unable to get utilities (heat, electricity) when it was really " needed?: No   Food Insecurity: Low Risk  (7/10/2024)    Food Insecurity     Within the past 12 months, did you worry that your food would run out before you got money to buy more?: No     Within the past 12 months, did the food you bought just not last and you didn t have money to get more?: No   Transportation Needs: Low Risk  (7/10/2024)    Transportation Needs     Within the past 12 months, has lack of transportation kept you from medical appointments, getting your medicines, non-medical meetings or appointments, work, or from getting things that you need?: No   Physical Activity: Sufficiently Active (7/10/2024)    Exercise Vital Sign     Days of Exercise per Week: 4 days     Minutes of Exercise per Session: 50 min   Stress: Stress Concern Present (7/10/2024)    [employer] [place] [employer] - Occupational Stress Questionnaire     Feeling of Stress : Very much   Social Connections: Unknown (7/10/2024)    Social Connection and Isolation Panel [NHANES]     Frequency of Social Gatherings with Friends and Family: Three times a week   Housing Stability: Low Risk  (7/10/2024)    Housing Stability     Do you have housing? : Yes     Are you worried about losing your housing?: No     Family History   Problem Relation Age of Onset    Mental Illness Mother     Diabetes Mother     Cancer Maternal Grandmother     Heart Failure Maternal Grandfather     Diabetes Maternal Grandfather        ALLERGIES:  Seasonal allergies    Current Outpatient Medications   Medication Sig Dispense Refill    cetirizine (ZYRTEC) 10 MG tablet Take 1 tablet (10 mg) by mouth daily 30 tablet 3    fluconazole (DIFLUCAN) 150 MG tablet Take 1 po today and 1 po after you finish antibiotic 2 tablet 0    hydrocortisone (ANUSOL-HC) 25 MG suppository Place 1 suppository (25 mg) rectally 2 times daily as needed for hemorrhoids 10 suppository 0    ibuprofen (ADVIL/MOTRIN) 200 MG tablet Take 200 mg by mouth every 4 hours as needed for pain (Patient not  taking: Reported on 7/23/2024)      levonorgestrel (MIRENA, 52 MG,) 20 MCG/DAY IUD 1 each by Intrauterine route once      VITAMIN D PO Take 1 capsule by mouth With fatty acids (Patient not taking: Reported on 7/23/2024)       No current facility-administered medications for this visit.         ROS:  ROS is done and is negative for general/constitutional, eye, ENT, Respiratory, cardiovascular, GI, , Skin, musculoskeletal except as noted elsewhere.  All other review of systems negative except as noted elsewhere.      OBJECTIVE:    No vital signs obtained as is virtual visit    GENERAL: alert and no distress  EYES: Eyes grossly normal to inspection.  No discharge or erythema, or obvious scleral/conjunctival abnormalities.  RESP: No audible wheeze, cough, or visible cyanosis.    SKIN: Visible skin clear. No significant rash, abnormal pigmentation or lesions.  NEURO: Cranial nerves grossly intact.  Mentation and speech appropriate for age.  PSYCH: Appropriate affect, tone, and pace of words         ASSESSMENT/PLAN:    ASSESSMENT / PLAN:  (J30.2) Seasonal allergic rhinitis, unspecified trigger  (primary encounter diagnosis)  Comment: advised to try taking claritin or zyrtec at bedtime if they make her a little drowsy.  Also add flonase  Plan: fluticasone (FLONASE) 50 MCG/ACT nasal spray        Reviewed medication instructions and side effects. Follow up if experiences side effects.. I reviewed supportive care, otc meds to use if needed, expected course, and signs of concern.  Follow up as needed or if does not improve within 1 week(s) or if worsens in any way.  Reviewed red flag symptoms and is to go to the ER if experiences any of these.        See [facility] for orders, medications, letters, patient instructions    Mis Greenberg MD  9/6/2024, 2:47 PM    Video-Visit Details    Video Start Time:  2:50    Type of service:  Video Visit    Video End Time:3:02 PM    Originating Location (pt. Location): Home    Distant  Location (provider location):  [facility]     Platform used for Video Visit: Malou

## 2024-09-13 ENCOUNTER — VIRTUAL VISIT (OUTPATIENT)
Dept: URGENT CARE | Facility: CLINIC | Age: 24
End: 2024-09-13
Payer: COMMERCIAL

## 2024-09-13 ENCOUNTER — TELEPHONE (OUTPATIENT)
Dept: URGENT CARE | Facility: URGENT CARE | Age: 24
End: 2024-09-13

## 2024-09-13 DIAGNOSIS — J20.9 ACUTE BRONCHITIS, UNSPECIFIED ORGANISM: ICD-10-CM

## 2024-09-13 DIAGNOSIS — J30.2 SEASONAL ALLERGIC RHINITIS, UNSPECIFIED TRIGGER: Primary | ICD-10-CM

## 2024-09-13 PROCEDURE — 99441 PR PHYSICIAN TELEPHONE EVALUATION 5-10 MIN: CPT | Mod: 93

## 2024-09-13 RX ORDER — ALBUTEROL SULFATE 90 UG/1
2 AEROSOL, METERED RESPIRATORY (INHALATION) EVERY 4 HOURS PRN
Qty: 18 G | Refills: 0 | Status: SHIPPED | OUTPATIENT
Start: 2024-09-13

## 2024-09-13 RX ORDER — LORATADINE 10 MG/1
10 TABLET ORAL DAILY
COMMUNITY

## 2024-09-13 RX ORDER — FLUTICASONE PROPIONATE 110 UG/1
1 AEROSOL, METERED RESPIRATORY (INHALATION) 2 TIMES DAILY
Qty: 12 G | Refills: 0 | Status: SHIPPED | OUTPATIENT
Start: 2024-09-13 | End: 2024-09-25

## 2024-09-13 NOTE — PROGRESS NOTES
Assessment & Plan     Seasonal allergic rhinitis, unspecified trigger  - albuterol (PROAIR HFA/PROVENTIL HFA/VENTOLIN HFA) 108 (90 Base) MCG/ACT inhaler; Inhale 2 puffs into the lungs every 4 hours as needed for shortness of breath or wheezing.  - fluticasone (FLOVENT HFA) 110 MCG/ACT inhaler; Inhale 1 puff into the lungs 2 times daily.    Acute bronchitis, unspecified organism  - albuterol (PROAIR HFA/PROVENTIL HFA/VENTOLIN HFA) 108 (90 Base) MCG/ACT inhaler; Inhale 2 puffs into the lungs every 4 hours as needed for shortness of breath or wheezing.  - fluticasone (FLOVENT HFA) 110 MCG/ACT inhaler; Inhale 1 puff into the lungs 2 times daily.    We discussed using a Flovent inhaler twice daily and albuterol inhaler every 4 hours as needed.  Establish care with primary provider to discuss maintenance medication if refills are needed.    Return in about 1 week (around 9/20/2024) for visit with primary care provider if not improving.     Marilu Cannon PA-C  [facility]    Subjective   Edie Saini is a 24 year old who presents for the following health issues    HPI    Edie presents via telephone for evaluation of chest tightness.  She had symptoms and seasonal allergies last month or so.  She had a virtual visit last week and was prescribed a steroid nasal spray.  She began using a different steroid nasal spray that she had at home and notes that her upper respiratory symptoms now.  Currently, she just has a cough, especially when in a dry environment.  Her chest feels tight.  She notes that this happens with the change of seasons, specifically beginning of the fall and spring.    Review of Systems   ROS negative except as stated above.      Objective    Physical Exam   GENERAL: healthy, alert and no distress  PSYCH: Alert and oriented times 3; coherent speech, normal   rate and volume, able to articulate logical thoughts, able   to abstract reason, no tangential thoughts, no hallucinations    or delusions. Affect is normal and pleasant  RESP: No cough, no audible wheezing, able to talk in full sentences  Remainder of exam unable to be completed due to telephone visits    Call duration: 7 min  Provider [place]  Patient location: at home, [place]    No results found for any visits on 09/13/24.

## 2024-09-13 NOTE — TELEPHONE ENCOUNTER
Patient returning provider call for virtual urgent care visit. Secure message sent to provider. Telephone encounter sent to provider to call patient back. John Carvalho RN, BSN

## 2024-09-13 NOTE — PATIENT INSTRUCTIONS
Flovent twice daily  Albuterol 2 puffs every 4 hours as needed for shortness of breath.    Rest! Your body needs more rest to heal.  Drink plenty of fluids (warm fluids like tea or soup are soothing and reduce cough)  Sit in the bathroom with a hot shower running and breathe in the steam.  Honey may soothe your sore throat and help manage your cough- may take straight or in warm water with lemon juice.  Avoid smoke (cigarettes, bonfires, fireplace, wood burning stoves).  Take Tylenol or an NSAID such as ibuprofen or naproxen as needed for pain.  Delsym (dextromethorphan polistirex) is an over the counter cough medication that lasts 12 hours.   Mucinex or Robitussin (guiafenesin) thin mucus and may help it to loosen more quickly    Good handwashing is the best way to prevent spread of germs  Present to emergency room if you develop trouble breathing, swallowing or cough-up blood.  Follow up with your primary care provider if symptoms worsen or fail to improve as expected.

## 2024-09-25 ENCOUNTER — TELEPHONE (OUTPATIENT)
Dept: URGENT CARE | Facility: URGENT CARE | Age: 24
End: 2024-09-25
Payer: COMMERCIAL

## 2024-09-25 DIAGNOSIS — J30.2 SEASONAL ALLERGIC RHINITIS, UNSPECIFIED TRIGGER: ICD-10-CM

## 2024-09-25 DIAGNOSIS — J20.9 ACUTE BRONCHITIS, UNSPECIFIED ORGANISM: ICD-10-CM

## 2024-09-25 RX ORDER — BUDESONIDE 90 UG/1
2 AEROSOL, POWDER RESPIRATORY (INHALATION)
Qty: 3 EACH | Refills: 0 | Status: SHIPPED | OUTPATIENT
Start: 2024-09-25

## 2024-09-25 RX ORDER — FLUTICASONE PROPIONATE 110 UG/1
1 AEROSOL, METERED RESPIRATORY (INHALATION) 2 TIMES DAILY
Qty: 12 G | Refills: 2 | Status: SHIPPED | OUTPATIENT
Start: 2024-09-25

## 2024-09-25 NOTE — TELEPHONE ENCOUNTER
New prescription sent. Sent a MyChart message to patient letting her know.    Gladys Cannon PA-C  [facility] [place]

## 2024-09-25 NOTE — TELEPHONE ENCOUNTER
Reason for call:  Other   Patient called regarding (reason for call): call back  Additional comments:patient got the albuterol you ordered but couldn't get the  fluticasone (FLOVENT HFA) 110 MCG/ACT inhaler     Can you send that to another pharmacy as she thinks that one may not have it    Send it to [facility] in [facility] on [address]    Phone number to reach patient:  Home number on file [phone] (home)    Best Time:  any    Can we leave a detailed message on this number?  YES    Travel screening: Not Applicable

## 2024-10-26 ENCOUNTER — OFFICE VISIT (OUTPATIENT)
Dept: URGENT CARE | Facility: URGENT CARE | Age: 24
End: 2024-10-26
Payer: COMMERCIAL

## 2024-10-26 VITALS
DIASTOLIC BLOOD PRESSURE: 70 MMHG | HEART RATE: 83 BPM | SYSTOLIC BLOOD PRESSURE: 107 MMHG | TEMPERATURE: 97.4 F | WEIGHT: 121 LBS | OXYGEN SATURATION: 100 % | BODY MASS INDEX: 22.13 KG/M2 | RESPIRATION RATE: 16 BRPM

## 2024-10-26 DIAGNOSIS — L03.012 CELLULITIS OF LEFT RING FINGER: Primary | ICD-10-CM

## 2024-10-26 PROCEDURE — 99213 OFFICE O/P EST LOW 20 MIN: CPT | Performed by: PHYSICIAN ASSISTANT

## 2024-10-26 RX ORDER — SULFAMETHOXAZOLE AND TRIMETHOPRIM 800; 160 MG/1; MG/1
1 TABLET ORAL 2 TIMES DAILY
Qty: 14 TABLET | Refills: 0 | Status: SHIPPED | OUTPATIENT
Start: 2024-10-26 | End: 2024-11-02

## 2024-10-26 NOTE — PATIENT INSTRUCTIONS
(L03.012) Cellulitis of left ring finger  (primary encounter diagnosis)  Comment:   Plan: sulfamethoxazole-trimethoprim (BACTRIM DS)         800-160 MG tablet            Local wound care daily apply bacitracin and bandage.    Follow-up with primary clinic should symptoms persist or worsen.

## 2024-10-26 NOTE — PROGRESS NOTES
Patient presents with:  Finger pain: Left hand, ring finger.     (L03.012) Cellulitis of left ring finger  (primary encounter diagnosis)  Comment:   Plan: sulfamethoxazole-trimethoprim (BACTRIM DS)         800-160 MG tablet            Local wound care daily apply bacitracin and bandage.    Follow-up with primary clinic should symptoms persist or worsen.    At the end of the encounter, I discussed results, diagnosis, medications. Discussed red flags for immediate return to clinic/ER, as well as indications for follow up if no improvement. Patient understood and agreed to plan. Patient was stable for discharge     If not improving or if condition worsens, follow up with your Primary Care Provider          SUBJECTIVE:   Edie Saini is a 24 year old female who presents today with a painful wound on her left 4th finger since she cut it with a broken mug over a week ago.  Denies any fevers but the area has been draining purulent material.    Tdap 2015        Past Medical History:   Diagnosis Date    Anxiety     PMDD (premenstrual dysphoric disorder)          Current Outpatient Medications   Medication Sig Dispense Refill    Multiple Vitamins-Iron (DAILY-SAMI/IRON/BETA-CAROTENE) TABS TAKE 1 TABLET BY MOUTH DAILY. (Patient not taking: Reported on 10/19/2020) 30 tablet 7     Social History     Tobacco Use    Smoking status: Never Smoker    Smokeless tobacco: Never Used   Substance Use Topics    Alcohol use: Not on file     Family History   Problem Relation Age of Onset    Diabetes Mother     Diabetes Father          ROS:    10 point ROS of systems including Constitutional, Eyes, Respiratory, Cardiovascular, Gastroenterology, Genitourinary, Integumentary, Muscularskeletal, Psychiatric ,neurological were all negative except for pertinent positives noted in my HPI       OBJECTIVE:  /70   Pulse 83   Temp 97.4  F (36.3  C) (Tympanic)   Resp 16   Wt 54.9 kg (121 lb)   SpO2 100%   BMI 22.13 kg/m    Physical  Exam:  GENERAL APPEARANCE: healthy, alert and no distress  Extremities: no peripheral edema or tenderness, peripheral pulses normal  NEURO: Normal strength and tone, sensory exam grossly normal,  normal speech and mentation  SKIN: Erythema around open wound with purulent drainage on left fourth finger.

## 2024-12-01 ENCOUNTER — HEALTH MAINTENANCE LETTER (OUTPATIENT)
Age: 24
End: 2024-12-01

## 2025-01-12 ENCOUNTER — OFFICE VISIT (OUTPATIENT)
Dept: URGENT CARE | Facility: URGENT CARE | Age: 25
End: 2025-01-12
Payer: COMMERCIAL

## 2025-01-12 VITALS
HEIGHT: 62 IN | WEIGHT: 120 LBS | HEART RATE: 103 BPM | SYSTOLIC BLOOD PRESSURE: 103 MMHG | OXYGEN SATURATION: 100 % | TEMPERATURE: 98.2 F | BODY MASS INDEX: 22.08 KG/M2 | DIASTOLIC BLOOD PRESSURE: 66 MMHG

## 2025-01-12 DIAGNOSIS — J06.9 VIRAL UPPER RESPIRATORY TRACT INFECTION WITH COUGH: Primary | ICD-10-CM

## 2025-01-12 DIAGNOSIS — R52 ACHES: ICD-10-CM

## 2025-01-12 LAB
DEPRECATED S PYO AG THROAT QL EIA: NEGATIVE
FLUAV AG SPEC QL IA: NEGATIVE
FLUBV AG SPEC QL IA: NEGATIVE
S PYO DNA THROAT QL NAA+PROBE: NOT DETECTED

## 2025-01-12 PROCEDURE — 99213 OFFICE O/P EST LOW 20 MIN: CPT | Performed by: PHYSICIAN ASSISTANT

## 2025-01-12 PROCEDURE — 87635 SARS-COV-2 COVID-19 AMP PRB: CPT | Performed by: PHYSICIAN ASSISTANT

## 2025-01-12 PROCEDURE — 87804 INFLUENZA ASSAY W/OPTIC: CPT | Performed by: PHYSICIAN ASSISTANT

## 2025-01-12 PROCEDURE — 87651 STREP A DNA AMP PROBE: CPT | Performed by: PHYSICIAN ASSISTANT

## 2025-01-12 ASSESSMENT — PAIN SCALES - GENERAL: PAINLEVEL_OUTOF10: MODERATE PAIN (5)

## 2025-01-12 NOTE — PROGRESS NOTES
"URGENT CARE VISIT:    SUBJECTIVE:   Edie Saini is a 25 year old female presenting with a chief complaint of chills, cough - productive, sore throat, headache, and body aches.  Onset was  a few day(s) ago.   She denies the following symptoms: shortness of breath  Course of illness is same.    Treatment measures tried include None tried with no relief of symptoms.  Predisposing factors include None.    PMH:   Past Medical History:   Diagnosis Date    Anxiety     PMDD (premenstrual dysphoric disorder)      Allergies: Seasonal allergies   Medications:   Current Outpatient Medications   Medication Sig Dispense Refill    levonorgestrel (MIRENA, 52 MG,) 20 MCG/DAY IUD 1 each by Intrauterine route once      albuterol (PROAIR HFA/PROVENTIL HFA/VENTOLIN HFA) 108 (90 Base) MCG/ACT inhaler Inhale 2 puffs into the lungs every 4 hours as needed for shortness of breath or wheezing. (Patient not taking: Reported on 1/12/2025) 18 g 0    budesonide (PULMICORT FLEXHALER) 90 MCG/ACT inhaler Inhale 2 puffs into the lungs two times daily. (Patient not taking: Reported on 1/12/2025) 3 each 0    fluticasone (FLONASE) 50 MCG/ACT nasal spray Spray 2 sprays into both nostrils daily. (Patient not taking: Reported on 1/12/2025) 16 g 0    fluticasone (FLOVENT HFA) 110 MCG/ACT inhaler Inhale 1 puff into the lungs 2 times daily. (Patient not taking: Reported on 1/12/2025) 12 g 2    loratadine (CLARITIN) 10 MG tablet Take 10 mg by mouth daily. (Patient not taking: Reported on 1/12/2025)      VITAMIN D PO Take 1 capsule by mouth With fatty acids (Patient not taking: Reported on 7/23/2024)       Social History:   Social History     Tobacco Use    Smoking status: Never     Passive exposure: Current    Smokeless tobacco: Never   Substance Use Topics    Alcohol use: Yes       ROS:  Review of systems negative except as stated above.    OBJECTIVE:  /66   Pulse 103   Temp 98.2  F (36.8  C) (Temporal)   Ht 1.575 m (5' 2\")   Wt 54.4 kg (120 lb)   " SpO2 100%   BMI 21.95 kg/m    GENERAL APPEARANCE: healthy, alert and no distress  EYES: EOMI,  PERRL, conjunctiva clear  HENT: ear canals and TM's normal.  Nose and mouth without ulcers, erythema or lesions  NECK: supple, nontender, no lymphadenopathy  RESP: lungs clear to auscultation - no rales, rhonchi or wheezes  CV: regular rates and rhythm, normal S1 S2, no murmur noted  SKIN: no suspicious lesions or rashes    Labs:    Results for orders placed or performed in visit on 01/12/25   Streptococcus A Rapid Screen w/Reflex to PCR - Clinic Collect     Status: Normal    Specimen: Throat; Swab   Result Value Ref Range    Group A Strep antigen Negative Negative   Influenza A & B Antigen - Clinic Collect     Status: Normal    Specimen: Nose; Swab   Result Value Ref Range    Influenza A antigen Negative Negative    Influenza B antigen Negative Negative    Narrative    Test results must be correlated with clinical data. If necessary, results should be confirmed by a molecular assay or viral culture.       ASSESSMENT:    ICD-10-CM    1. Viral upper respiratory tract infection with cough  J06.9 Streptococcus A Rapid Screen w/Reflex to PCR - Clinic Collect     COVID-19 Virus (Coronavirus) by PCR Nose     Group A Streptococcus PCR Throat Swab      2. Aches  R52 Streptococcus A Rapid Screen w/Reflex to PCR - Clinic Collect     COVID-19 Virus (Coronavirus) by PCR Nose     Influenza A & B Antigen - Clinic Collect     Group A Streptococcus PCR Throat Swab          PLAN:  Patient Instructions   Patient was educated on the natural course of viral illness which typically lasts up to 10 days.  Strep and COVID PCR are pending. Flu was negative. Conservative measures discussed including increased fluids, nasal saline irrigation (neti pot), warm steamy shower, salt water gargles, expectorants (Mucinex), and analgesics (Tylenol and/or Ibuprofen). See your primary care provider if symptoms worsen or do not improve in 7 days. Seek  emergency care if you develop fever over 104 or shortness of breath.  Patient verbalized understanding and is agreeable to plan. The patient was discharged ambulatory and in stable condition.    Yajaira Calhoun PA-C ....................  1/12/2025   11:50 AM

## 2025-01-12 NOTE — PATIENT INSTRUCTIONS
Patient was educated on the natural course of viral illness which typically lasts up to 10 days.  Strep and COVID PCR are pending. Flu was negative. Conservative measures discussed including increased fluids, nasal saline irrigation (neti pot), warm steamy shower, salt water gargles, expectorants (Mucinex), and analgesics (Tylenol and/or Ibuprofen). See your primary care provider if symptoms worsen or do not improve in 7 days. Seek emergency care if you develop fever over 104 or shortness of breath.

## 2025-01-13 LAB — SARS-COV-2 RNA RESP QL NAA+PROBE: POSITIVE

## 2025-03-24 ENCOUNTER — OFFICE VISIT (OUTPATIENT)
Dept: URGENT CARE | Facility: URGENT CARE | Age: 25
End: 2025-03-24
Payer: COMMERCIAL

## 2025-03-24 VITALS
SYSTOLIC BLOOD PRESSURE: 117 MMHG | OXYGEN SATURATION: 100 % | HEART RATE: 83 BPM | RESPIRATION RATE: 18 BRPM | TEMPERATURE: 98.9 F | HEIGHT: 62 IN | BODY MASS INDEX: 22.08 KG/M2 | WEIGHT: 120 LBS | DIASTOLIC BLOOD PRESSURE: 79 MMHG

## 2025-03-24 DIAGNOSIS — R68.83 CHILLS: ICD-10-CM

## 2025-03-24 DIAGNOSIS — R07.0 THROAT PAIN: ICD-10-CM

## 2025-03-24 DIAGNOSIS — J10.1 INFLUENZA B: Primary | ICD-10-CM

## 2025-03-24 LAB
DEPRECATED S PYO AG THROAT QL EIA: NEGATIVE
FLUAV AG SPEC QL IA: NEGATIVE
FLUBV AG SPEC QL IA: POSITIVE

## 2025-03-24 PROCEDURE — 1125F AMNT PAIN NOTED PAIN PRSNT: CPT | Performed by: PHYSICIAN ASSISTANT

## 2025-03-24 PROCEDURE — 99213 OFFICE O/P EST LOW 20 MIN: CPT | Performed by: PHYSICIAN ASSISTANT

## 2025-03-24 PROCEDURE — 3074F SYST BP LT 130 MM HG: CPT | Performed by: PHYSICIAN ASSISTANT

## 2025-03-24 PROCEDURE — 3078F DIAST BP <80 MM HG: CPT | Performed by: PHYSICIAN ASSISTANT

## 2025-03-24 PROCEDURE — 87651 STREP A DNA AMP PROBE: CPT | Performed by: PHYSICIAN ASSISTANT

## 2025-03-24 PROCEDURE — 87804 INFLUENZA ASSAY W/OPTIC: CPT | Performed by: PHYSICIAN ASSISTANT

## 2025-03-24 ASSESSMENT — PAIN SCALES - GENERAL: PAINLEVEL_OUTOF10: MODERATE PAIN (5)

## 2025-03-24 NOTE — PROGRESS NOTES
"SUBJECTIVE:   Edie Saini is a 25 year old female presenting with a chief complaint of chills, sore throat, and headache.  Onset of symptoms was 1 day(s) ago.  Course of illness is same.    Severity moderate  Current and Associated symptoms: as above  Treatment measures tried include Fluids and Rest.  Predisposing factors include ill contact: Work.    Past Medical History:   Diagnosis Date    Anxiety     PMDD (premenstrual dysphoric disorder)      Current Outpatient Medications   Medication Sig Dispense Refill    levonorgestrel (MIRENA, 52 MG,) 20 MCG/DAY IUD 1 each by Intrauterine route once      loratadine (CLARITIN) 10 MG tablet Take 10 mg by mouth daily.      MIRTAZAPINE PO Take by mouth at bedtime.      VITAMIN D PO Take 1 capsule by mouth. With fatty acids      albuterol (PROAIR HFA/PROVENTIL HFA/VENTOLIN HFA) 108 (90 Base) MCG/ACT inhaler Inhale 2 puffs into the lungs every 4 hours as needed for shortness of breath or wheezing. (Patient not taking: Reported on 3/24/2025) 18 g 0    budesonide (PULMICORT FLEXHALER) 90 MCG/ACT inhaler Inhale 2 puffs into the lungs two times daily. (Patient not taking: Reported on 3/24/2025) 3 each 0    fluticasone (FLONASE) 50 MCG/ACT nasal spray Spray 2 sprays into both nostrils daily. (Patient not taking: Reported on 3/24/2025) 16 g 0    fluticasone (FLOVENT HFA) 110 MCG/ACT inhaler Inhale 1 puff into the lungs 2 times daily. (Patient not taking: Reported on 3/24/2025) 12 g 2     Social History     Tobacco Use    Smoking status: Never     Passive exposure: Current    Smokeless tobacco: Never   Substance Use Topics    Alcohol use: Yes       ROS:  Review of systems negative except as stated above.    OBJECTIVE:  /79   Pulse 83   Temp 98.9  F (37.2  C) (Temporal)   Resp 18   Ht 1.575 m (5' 2\")   Wt 54.4 kg (120 lb)   SpO2 100%   BMI 21.95 kg/m    GENERAL APPEARANCE: healthy, alert and no distress  HENT: ear canals and TM's normal.  Nose and mouth without ulcers, " erythema or lesions  RESP: lungs clear to auscultation - no rales, rhonchi or wheezes  CV: regular rates and rhythm  NEURO: Normal strength and tone  SKIN: no suspicious lesions or rashes      Results for orders placed or performed in visit on 03/24/25   Streptococcus A Rapid Screen w/Reflex to PCR - Clinic Collect     Status: Normal    Specimen: Throat; Swab   Result Value Ref Range    Group A Strep antigen Negative Negative   Influenza A & B Antigen - Clinic Collect     Status: Abnormal    Specimen: Nose; Swab   Result Value Ref Range    Influenza A antigen Negative Negative    Influenza B antigen Positive (A) Negative    Narrative    Test results must be correlated with clinical data. If necessary, results should be confirmed by a molecular assay or viral culture.       ASSESSMENT:  (J10.1) Influenza B  (primary encounter diagnosis)  (R07.0) Throat pain  Comment:treatment not indicated   Plan: Streptococcus A Rapid Screen w/Reflex to PCR -         Clinic Collect, Group A Streptococcus PCR         Throat Swab      (R68.83) Chills  Plan: Influenza A & B Antigen - Clinic Collect      Follow up with PCP if symptoms worsen or fail to improve

## 2025-03-25 LAB — S PYO DNA THROAT QL NAA+PROBE: NOT DETECTED

## 2025-07-07 ENCOUNTER — MYC MEDICAL ADVICE (OUTPATIENT)
Dept: OBGYN | Facility: CLINIC | Age: 25
End: 2025-07-07
Payer: COMMERCIAL

## 2025-07-07 ENCOUNTER — TELEPHONE (OUTPATIENT)
Dept: OBGYN | Facility: CLINIC | Age: 25
End: 2025-07-07
Payer: COMMERCIAL

## 2025-07-07 NOTE — TELEPHONE ENCOUNTER
[facility] Call Center    Phone Message    May a detailed message be left on voicemail: yes     Reason for Call: Patient wanting to discuss pain management options ahead of her appt for IUD removal and reinsertion. Please advise. Thank you!    Action Taken: Message routed to:  Other: [facility]    Travel Screening: Not Applicable     Date of Service: